# Patient Record
Sex: FEMALE | Race: ASIAN
[De-identification: names, ages, dates, MRNs, and addresses within clinical notes are randomized per-mention and may not be internally consistent; named-entity substitution may affect disease eponyms.]

---

## 2019-10-15 ENCOUNTER — HOSPITAL ENCOUNTER (OUTPATIENT)
Dept: HOSPITAL 75 - RAD | Age: 74
End: 2019-10-15
Attending: NURSE PRACTITIONER
Payer: MEDICARE

## 2019-10-15 DIAGNOSIS — M85.89: ICD-10-CM

## 2019-10-15 DIAGNOSIS — M89.49: ICD-10-CM

## 2019-10-15 DIAGNOSIS — Z78.0: ICD-10-CM

## 2019-10-15 DIAGNOSIS — Z13.820: Primary | ICD-10-CM

## 2019-10-15 PROCEDURE — 77080 DXA BONE DENSITY AXIAL: CPT

## 2019-10-15 NOTE — DIAGNOSTIC IMAGING REPORT
INDICATION: Postmenopausal state, screening for osteoporosis



COMPARISON: 11/10/2016



FINDINGS:



AP Spine L1-L4:  

[BMD (g/cm2): 1.255] [T-Score: .5] [Z-Score: na]

[BMD Previous: 1.264] [BMD % Change: -0.7]



LT Hip Neck:       

[BMD (g/cm2): 0.844] [T-Score: -1.3] [Z-Score: na]



LT Hip Total:       

[BMD (g/cm2):0.844] [T-Score:-1.3] [Z-Score: na]

[BMD Previous: 0.825] [BMD % Change: 2.3]



RT Hip Neck:      

[BMD (g/cm2):0.816] [T-Score:-1.6] [Z-Score:na]



RT Hip Total:      

[BMD (g/cm2):0.835] [T-score:-1.4] [Z-Score:na]

[BMD Previous:0.838] [BMD % Change:-0.4]



*Indicates significant change from prior examination based on 95%

confidence level.



World Health Organization criteria for BMD interpretation

classify patients as Normal (T-score at or above -1.0),

Osteopenic (T-score between -1.0 and -2.5) or Osteoporotic

(T-score at or below -2.5).



LIMITATIONS AND MODIFICATION:  None.



FRACTURE RISK (FRAX SCORE):

The ten year probability of (%): 

Major Osteoporotic Fracture: [10.4]

Hip Fracture: [2.3]



IMPRESSION:

1. Osteopenia (Low bone mass).

2. No significant change in bone mineral density since prior

examination. 

3. See below National Osteoporosis Foundation guidelines on when

to potentially initiate pharmacologic therapy. 



Based on the National Osteoporosis Foundation Guidelines,

pharmacologic treatment should be initiated in any of the

following, unless clinical conditions suggest otherwise:



*  Any patient with prior fragility fracture of the hip or

vertebrae. A spine fracture indicates 5X risk for subsequent

spine fracture and 2X risk for subsequent hip fracture.



*  Osteoporosis (T-score <-2.5).



*  Postmenopausal women and men age 50 and older with low bone

mass/osteopenia (T-score between -1.0 and -2.5) by DXA and

10-year major osteoporotic fracture greater than 20% or a 10-year

probability of hip fracture greater than 3%. These fracture risks

are supplied above in the FRAX score, if applicable.



*  Clinician judgement and/or patient preferences may indicate

treatment for people with 10-year fracture probabilities above or

below these levels.



Dictated by: 



  Dictated on workstation # [id number]

## 2020-12-10 ENCOUNTER — HOSPITAL ENCOUNTER (OUTPATIENT)
Dept: HOSPITAL 75 - CARD | Age: 75
End: 2020-12-10
Attending: INTERNAL MEDICINE
Payer: MEDICARE

## 2020-12-10 DIAGNOSIS — I08.3: Primary | ICD-10-CM

## 2020-12-10 DIAGNOSIS — I48.0: ICD-10-CM

## 2020-12-10 LAB
ALBUMIN SERPL-MCNC: 4.5 GM/DL (ref 3.2–4.5)
ALP SERPL-CCNC: 33 U/L (ref 40–136)
ALT SERPL-CCNC: 16 U/L (ref 0–55)
BILIRUB SERPL-MCNC: 0.3 MG/DL (ref 0.1–1)
BUN/CREAT SERPL: 21
CALCIUM SERPL-MCNC: 9.6 MG/DL (ref 8.5–10.1)
CHLORIDE SERPL-SCNC: 103 MMOL/L (ref 98–107)
CO2 SERPL-SCNC: 32 MMOL/L (ref 21–32)
CREAT SERPL-MCNC: 0.76 MG/DL (ref 0.6–1.3)
GFR SERPLBLD BASED ON 1.73 SQ M-ARVRAT: > 60 ML/MIN
GLUCOSE SERPL-MCNC: 101 MG/DL (ref 70–105)
HCT VFR BLD CALC: 36 % (ref 35–52)
HGB BLD-MCNC: 10.8 G/DL (ref 11.5–16)
MCH RBC QN AUTO: 24 PG (ref 25–34)
MCHC RBC AUTO-ENTMCNC: 30 G/DL (ref 32–36)
MCV RBC AUTO: 80 FL (ref 80–99)
PLATELET # BLD: 251 10^3/UL (ref 130–400)
PMV BLD AUTO: 11.1 FL (ref 9–12.2)
POTASSIUM SERPL-SCNC: 3.5 MMOL/L (ref 3.6–5)
PROT SERPL-MCNC: 7.4 GM/DL (ref 6.4–8.2)
SODIUM SERPL-SCNC: 142 MMOL/L (ref 135–145)
WBC # BLD AUTO: 8.6 10^3/UL (ref 4.3–11)

## 2020-12-10 PROCEDURE — 84443 ASSAY THYROID STIM HORMONE: CPT

## 2020-12-10 PROCEDURE — 85027 COMPLETE CBC AUTOMATED: CPT

## 2020-12-10 PROCEDURE — 80053 COMPREHEN METABOLIC PANEL: CPT

## 2020-12-10 PROCEDURE — 36415 COLL VENOUS BLD VENIPUNCTURE: CPT

## 2020-12-10 PROCEDURE — 93306 TTE W/DOPPLER COMPLETE: CPT

## 2021-01-18 ENCOUNTER — HOSPITAL ENCOUNTER (OUTPATIENT)
Dept: HOSPITAL 75 - LABNPT | Age: 76
End: 2021-01-18
Attending: INTERNAL MEDICINE
Payer: MEDICARE

## 2021-01-18 DIAGNOSIS — Z20.822: ICD-10-CM

## 2021-01-18 DIAGNOSIS — Z01.89: Primary | ICD-10-CM

## 2021-01-18 PROCEDURE — 87635 SARS-COV-2 COVID-19 AMP PRB: CPT

## 2021-01-20 ENCOUNTER — HOSPITAL ENCOUNTER (OUTPATIENT)
Dept: HOSPITAL 75 - SDC | Age: 76
End: 2021-01-20
Attending: INTERNAL MEDICINE
Payer: MEDICARE

## 2021-01-20 VITALS — SYSTOLIC BLOOD PRESSURE: 118 MMHG | DIASTOLIC BLOOD PRESSURE: 73 MMHG

## 2021-01-20 VITALS — SYSTOLIC BLOOD PRESSURE: 127 MMHG | DIASTOLIC BLOOD PRESSURE: 72 MMHG

## 2021-01-20 VITALS — DIASTOLIC BLOOD PRESSURE: 74 MMHG | SYSTOLIC BLOOD PRESSURE: 130 MMHG

## 2021-01-20 VITALS — DIASTOLIC BLOOD PRESSURE: 96 MMHG | SYSTOLIC BLOOD PRESSURE: 114 MMHG

## 2021-01-20 VITALS — SYSTOLIC BLOOD PRESSURE: 124 MMHG | DIASTOLIC BLOOD PRESSURE: 93 MMHG

## 2021-01-20 VITALS — SYSTOLIC BLOOD PRESSURE: 136 MMHG | DIASTOLIC BLOOD PRESSURE: 85 MMHG

## 2021-01-20 VITALS — HEIGHT: 64.02 IN | BODY MASS INDEX: 27.48 KG/M2 | WEIGHT: 160.94 LBS

## 2021-01-20 VITALS — DIASTOLIC BLOOD PRESSURE: 72 MMHG | SYSTOLIC BLOOD PRESSURE: 122 MMHG

## 2021-01-20 DIAGNOSIS — I10: ICD-10-CM

## 2021-01-20 DIAGNOSIS — I48.0: Primary | ICD-10-CM

## 2021-01-20 DIAGNOSIS — Z79.899: ICD-10-CM

## 2021-01-20 DIAGNOSIS — E11.9: ICD-10-CM

## 2021-01-20 DIAGNOSIS — Z79.84: ICD-10-CM

## 2021-01-20 DIAGNOSIS — Z79.01: ICD-10-CM

## 2021-01-20 DIAGNOSIS — E66.9: ICD-10-CM

## 2021-01-20 LAB
ALBUMIN SERPL-MCNC: 4.4 GM/DL (ref 3.2–4.5)
ALP SERPL-CCNC: 39 U/L (ref 40–136)
ALT SERPL-CCNC: 19 U/L (ref 0–55)
APTT BLD: 36 SEC (ref 24–35)
BILIRUB SERPL-MCNC: 0.4 MG/DL (ref 0.1–1)
BUN/CREAT SERPL: 28
CALCIUM SERPL-MCNC: 9.5 MG/DL (ref 8.5–10.1)
CHLORIDE SERPL-SCNC: 106 MMOL/L (ref 98–107)
CHOLEST SERPL-MCNC: 115 MG/DL (ref ?–200)
CO2 SERPL-SCNC: 28 MMOL/L (ref 21–32)
CREAT SERPL-MCNC: 0.79 MG/DL (ref 0.6–1.3)
GFR SERPLBLD BASED ON 1.73 SQ M-ARVRAT: > 60 ML/MIN
GLUCOSE SERPL-MCNC: 110 MG/DL (ref 70–105)
HCT VFR BLD CALC: 38 % (ref 35–52)
HDLC SERPL-MCNC: 66 MG/DL (ref 40–60)
HGB BLD-MCNC: 11.6 G/DL (ref 11.5–16)
INR PPP: 1.1 (ref 0.8–1.4)
MCH RBC QN AUTO: 24 PG (ref 25–34)
MCHC RBC AUTO-ENTMCNC: 31 G/DL (ref 32–36)
MCV RBC AUTO: 78 FL (ref 80–99)
PLATELET # BLD: 235 10^3/UL (ref 130–400)
PMV BLD AUTO: 11.7 FL (ref 9–12.2)
POTASSIUM SERPL-SCNC: 4.1 MMOL/L (ref 3.6–5)
PROT SERPL-MCNC: 7.2 GM/DL (ref 6.4–8.2)
PROTHROMBIN TIME: 14.9 SEC (ref 12.2–14.7)
SODIUM SERPL-SCNC: 141 MMOL/L (ref 135–145)
TRIGL SERPL-MCNC: 26 MG/DL (ref ?–150)
VLDLC SERPL CALC-MCNC: 5 MG/DL (ref 5–40)
WBC # BLD AUTO: 6.3 10^3/UL (ref 4.3–11)

## 2021-01-20 PROCEDURE — 80053 COMPREHEN METABOLIC PANEL: CPT

## 2021-01-20 PROCEDURE — 85610 PROTHROMBIN TIME: CPT

## 2021-01-20 PROCEDURE — 80061 LIPID PANEL: CPT

## 2021-01-20 PROCEDURE — 92960 CARDIOVERSION ELECTRIC EXT: CPT

## 2021-01-20 PROCEDURE — 85027 COMPLETE CBC AUTOMATED: CPT

## 2021-01-20 PROCEDURE — 93312 ECHO TRANSESOPHAGEAL: CPT

## 2021-01-20 PROCEDURE — 93005 ELECTROCARDIOGRAM TRACING: CPT

## 2021-01-20 PROCEDURE — 36415 COLL VENOUS BLD VENIPUNCTURE: CPT

## 2021-01-20 PROCEDURE — 87081 CULTURE SCREEN ONLY: CPT

## 2021-01-20 PROCEDURE — 85730 THROMBOPLASTIN TIME PARTIAL: CPT

## 2021-01-20 PROCEDURE — 71045 X-RAY EXAM CHEST 1 VIEW: CPT

## 2021-01-20 NOTE — DIAGNOSTIC IMAGING REPORT
INDICATION: Pre-heart catheterization.



TIME OF EXAM: 10:48 AM



Correlation is made with prior chest 07/03/2015.



FINDINGS: Heart is enlarged. Lungs are clear. No infiltrate or

failure is detected. The pulmonary vascularity is normal. No

effusion or pneumothorax is identified.



IMPRESSION: Cardiomegaly. No acute cardiopulmonary process is

detected.



Dictated by: 



  Dictated on workstation # [id number]

## 2021-01-20 NOTE — CARDIOVERSION
Cardioversion


PROCEDURE PHYSICIAN:   Ave Muniz 


 


DATE OF PROCEDURE:  1/20/21 


 


DIRECT EXTERNAL ELECTRICAL CARDIOVERSION:  


 


Indications:


Atrial Fibrillation with rapid ventricular rate





Preoperative diagnoses: 


Atrial Fibrillation with rapid ventricular rate


 


Postoperative diagnosis: 


Sinus rhythm, Successful Electrical Cardioversion





Anesthesia:


By Anesthesia services





Complications: 


None





Specimen: 


None





Contrast: 0





Flouroscopy: none





Procedure Details:


 


The patient was brought the cath lab after informed consent was taken, all the 

risks and complications were explained including the risk of stroke. Electrical 

cardioversion was carried out with anesthesia support with propofol. 150 joules 

of synchronized shock was  delivered through external patches which promptly 

restored sinus rhythm. The patient tolerated the procedure well.  





Conclusions:


Successful electrical cardioversion with no complication





Final Diagnosis:


Paroxysmal atrial fibrillation


Palpitation


Hypertension


Diabetes mellitus











AVE MUNIZ MD              Jan 20, 2021 11:45

## 2021-01-20 NOTE — NUR
SPOKE WITH THE PT AND HER  AND CALLED JULIAN AND EDIN TO COMPLETE THE MED REC



11- HCTZ 25MG #90/90DS

11- PANTOPRAZOLE 40MG #90/90DS

11- METFORMIN 1000MG #180

11- MELOXICAM 15MG #90/90DS

12- DILTIAZEM ER 120MG #60/30DS

01- FLECAINIDE 50MG #60/3D0S



METFORMIN 1000MG- DIRECTIONS ARE 1 TAB BID BUT ACCORDING TO THE PT SHE ONLY TAKES A DOSE IF 
HE BLOOD SUGAR IS OVER 125



OTC MEDS:

GLUCOSAMINE/CHONDROITIN

FISH OIL

VIT C

VIT D3

ZINC

MAGNESIUM

IRON

## 2021-01-20 NOTE — ANESTHESIA-GENERAL POST-OP
MAC


Patient Condition


Mental Status/LOC:  Same as Preop


Cardiovascular:  Satisfactory


Nausea/Vomiting:  Absent


Respiratory:  Satisfactory


Pain:  Controlled


Complications:  Absent





Post Op Complications


Complications


None





Follow Up Care/Instructions


Patient Instructions


None needed.





Anesthesiology Discharge Order


Discharge Order


Patient is doing well, no complaints, stable vital signs, no apparent adverse 

anesthesia problems.











DEJON BENJAMIN DO         Jan 20, 2021 11:34

## 2021-01-20 NOTE — DISCHARGE INST-CARDIOLOGY
Discharge Inst-Cardiac


Problems Reviewed?:  Yes


Discharge Medications


Continued Medications:  


Apixaban (Eliquis) 5 Mg Tablet


5 MG PO BID, TAB





Ascorbic Acid (Vitamin C) 500 Mg Tab.chew


500 MG PO DAILY, TAB





Cholecalciferol (Vitamin D3) (Vitamin D3) 25 Mcg Capsule


25 MCG PO DAILY, CAP





Diltiazem HCl (Diltiazem 24Hr Cd) 120 Mg Cap.er.24h


120 MG PO BID, CAP





Ferrous Sulfate (Iron) 325 Mg Tablet


325 MG PO DAILY, TAB





Fish Oil/Dha/Epa (Fish Oil 1,200 mg Fish Oil) 1 Each Capsule


1 EACH PO DAILY, CAP





Flecainide Acetate (Flecainide Acetate) 50 Mg Tablet


50 MG PO Q12H, TAB





Glucosa Kumari 2Kcl/Chondroitin Kumari (Glucosamine & Chondroitin Cap) 1 Each Capsule


1 EACH PO DAILY, CAP





Hydrochlorothiazide (Hydrochlorothiazide) 25 Mg Tablet


25 MG PO DAILY, TAB





Magnesium Oxide (Magnesium) 400 Mg Tablet


400 MG PO WEEK, TAB





Meloxicam (Meloxicam) 15 Mg Tablet


15 MG PO DAILY, TAB





Metformin HCl (Metformin HCl) 1,000 Mg Tablet


1000 MG PO BID PRN for BLOOD SUGAR OVER 125, TAB





Pantoprazole Sodium (Pantoprazole Sodium) 40 Mg Tablet.dr


40 MG PO DAILY, TAB





Zinc Gluconate (Zinc) 50 Mg Tablet


50 MG PO DAILY, TAB











Patient Instructions


Patient Instructions:  


Appointment with Dr. Muniz's office in 2-4 weeks





Activity & Diet


Discharge Diet:  Cardiac Diet


Drink 6-8 Glasses/Fluids/Day:  Yes


Activity as Tolerated:  Yes











AVE MUNIZ MD              Jan 20, 2021 11:44

## 2021-04-14 ENCOUNTER — HOSPITAL ENCOUNTER (OUTPATIENT)
Dept: HOSPITAL 75 - LABNPT | Age: 76
End: 2021-04-14
Attending: INTERNAL MEDICINE
Payer: MEDICARE

## 2021-04-14 DIAGNOSIS — Z53.9: Primary | ICD-10-CM

## 2021-10-19 ENCOUNTER — HOSPITAL ENCOUNTER (OUTPATIENT)
Dept: HOSPITAL 75 - RAD | Age: 76
End: 2021-10-19
Attending: FAMILY MEDICINE
Payer: MEDICARE

## 2021-10-19 DIAGNOSIS — Z78.0: Primary | ICD-10-CM

## 2021-10-19 PROCEDURE — 77080 DXA BONE DENSITY AXIAL: CPT

## 2021-10-19 NOTE — DIAGNOSTIC IMAGING REPORT
INDICATION: Postmenopausal.



COMPARISON: 10/15/2019.



FINDINGS: The bone mineral density of the spine, hips, and

femoral necks was measured.



The total T-score for the spine is 1.1. On the prior exam, the

T-score was 0.5.



The total T-score for the left hip is -1.2 and for the right hip

-1.4. On the previous exam, the respected T-scores were -1.3 and

-1.4.



The T-score for the left femoral neck is -1.1 and for the right

-1.6. Previously the T-scores were -1.2 and -1.6.



AP Spine L1-L4:  

[BMD (g/cm2): 1.333] [T-Score: 1.1] [Z-Score: NA]

[BMD Previous: 1.255] [BMD % Change: 6.2]



LT Hip Neck:       

[BMD (g/cm2): 0.878] [T-Score: -1.1] [Z-Score: NA]



LT Hip Total:       

[BMD (g/cm2):0.856] [T-Score:-1.2] [Z-Score: NA]

[BMD Previous: 0.844] [BMD % Change: 2.3]



RT Hip Neck:      

[BMD (g/cm2):0.814] [T-Score:-1.6] [Z-Score:NA]



RT Hip Total:      

[BMD (g/cm2):0.826] [T-score:-1.4] [Z-Score:NA]

[BMD Previous:0.835] [BMD % Change:-0.4]



*Indicates significant change from prior examination based on 95%

confidence level.



World Health Organization criteria for BMD interpretation

classify patients as Normal (T-score at or above -1.0),

Osteopenic (T-score between -1.0 and -2.5) or Osteoporotic

(T-score at or below -2.5).



LIMITATIONS AND MODIFICATION:  None.



FRACTURE RISK (FRAX SCORE):

The ten year probability of (%): 

Major Osteoporotic Fracture: [13.1]

Hip Fracture: [3.1]



IMPRESSION:

1. The bone mineral density of the spine has increased since the

prior exam. The T-score is well within normal limits.

2. The bone mineral density of the hips and femoral necks has not

changed significantly. These values do indicate osteopenia.

3. See below National Osteoporosis Foundation guidelines on when

to potentially initiate pharmacologic therapy. 



Based on the National Osteoporosis Foundation Guidelines,

pharmacologic treatment should be initiated in any of the

following, unless clinical conditions suggest otherwise:



*  Any patient with prior fragility fracture of the hip or

vertebrae. A spine fracture indicates 5X risk for subsequent

spine fracture and 2X risk for subsequent hip fracture.



*  Osteoporosis (T-score <-2.5).



*  Postmenopausal women and men age 50 and older with low bone

mass/osteopenia (T-score between -1.0 and -2.5) by DXA and

10-year major osteoporotic fracture greater than 20% or a 10-year

probability of hip fracture greater than 3%. These fracture risks

are supplied above in the FRAX score, if applicable.



*  Clinician judgement and/or patient preferences may indicate

treatment for people with 10-year fracture probabilities above or

below these levels.



Dictated by: 



  Dictated on workstation # [id number]

## 2021-12-03 ENCOUNTER — HOSPITAL ENCOUNTER (OUTPATIENT)
Dept: HOSPITAL 75 - RAD | Age: 76
End: 2021-12-03
Attending: PODIATRIST
Payer: MEDICARE

## 2021-12-03 DIAGNOSIS — R05.3: Primary | ICD-10-CM

## 2021-12-03 PROCEDURE — 71046 X-RAY EXAM CHEST 2 VIEWS: CPT

## 2021-12-03 NOTE — DIAGNOSTIC IMAGING REPORT
EXAMINATION: Chest 2 view



HISTORY: Cough



COMPARISON: 01/20/2021



FINDINGS: 



The lungs are clear without edema or pneumonia. No pleural

effusion or pneumothorax. Heart size is normal.



IMPRESSION: 



1. Clear lungs.



Dictated by: 



  Dictated on workstation # [id number]

## 2022-04-20 ENCOUNTER — HOSPITAL ENCOUNTER (OUTPATIENT)
Dept: HOSPITAL 75 - CARD | Age: 77
End: 2022-04-20
Attending: INTERNAL MEDICINE
Payer: MEDICARE

## 2022-04-20 VITALS — SYSTOLIC BLOOD PRESSURE: 145 MMHG | DIASTOLIC BLOOD PRESSURE: 83 MMHG

## 2022-04-20 DIAGNOSIS — I25.10: ICD-10-CM

## 2022-04-20 DIAGNOSIS — I08.3: ICD-10-CM

## 2022-04-20 DIAGNOSIS — I11.9: Primary | ICD-10-CM

## 2022-04-20 PROCEDURE — 93017 CV STRESS TEST TRACING ONLY: CPT

## 2022-04-20 PROCEDURE — 93306 TTE W/DOPPLER COMPLETE: CPT

## 2022-04-20 PROCEDURE — 78452 HT MUSCLE IMAGE SPECT MULT: CPT

## 2022-04-20 NOTE — CARDIOLOGY STRESS TEST REPORT
Stress Test Report


Date of Procedure/Referring:


Date of Procedure:  Apr 20, 2022


PCP


Bipin Muniz MD


Admitting Physician


Frank Garcia MD





Indications:


CP





Baseline Heart Rate:


57





Baseline Blood Pressure:


Blood Pressure Systolic:  145


Blood Pressure Diastolic:  83


Baseline Vitals





Vital Signs








  Date Time  Temp Pulse Resp B/P (MAP) Pulse Ox O2 Delivery O2 Flow Rate FiO2


 


4/20/22 10:04  57  145/83 (103)    











Baseline EKG:


Baseline EKG:  NSR





Summary


After explaining the procedure to the patient, she  signed a consent and then 

brought to the stress nuclear laboratory.


Patient received 0.4 mg Lexiscan for stress test, ECG, heart rate and blood 

pressure were monitored continuously.  Resting and stress dose of radio tracer 

were injected, imaging was acquired and reviewed in short axis, horizontal long 

axis and vertical long axis views.


TID:  0.97


SSS:  0


SDS:  0


EF:  77


1.  Patient tolerated Lexiscan well


2.  No significant ischemia or infarction on SPECT images


3.  Normal left ventricular size, EF 77%





Copy


Copies To 1:   FRANK GARCIA MD, BASHAR J MD              Apr 20, 2022 12:46

## 2022-08-24 ENCOUNTER — HOSPITAL ENCOUNTER (OUTPATIENT)
Dept: HOSPITAL 75 - PREOP | Age: 77
LOS: 5 days | End: 2022-08-29
Attending: SPECIALIST
Payer: MEDICARE

## 2022-08-24 VITALS — HEIGHT: 62.01 IN | WEIGHT: 156.31 LBS | BODY MASS INDEX: 28.4 KG/M2

## 2022-08-24 DIAGNOSIS — Z01.818: Primary | ICD-10-CM

## 2022-08-24 DIAGNOSIS — H25.9: ICD-10-CM

## 2022-09-02 ENCOUNTER — HOSPITAL ENCOUNTER (OUTPATIENT)
Dept: HOSPITAL 75 - SDC | Age: 77
Discharge: HOME | End: 2022-09-02
Attending: SPECIALIST
Payer: MEDICARE

## 2022-09-02 VITALS — SYSTOLIC BLOOD PRESSURE: 150 MMHG | DIASTOLIC BLOOD PRESSURE: 79 MMHG

## 2022-09-02 VITALS — HEIGHT: 62.01 IN | WEIGHT: 156.31 LBS | BODY MASS INDEX: 28.4 KG/M2

## 2022-09-02 VITALS — SYSTOLIC BLOOD PRESSURE: 162 MMHG | DIASTOLIC BLOOD PRESSURE: 86 MMHG

## 2022-09-02 DIAGNOSIS — R73.03: ICD-10-CM

## 2022-09-02 DIAGNOSIS — H25.9: Primary | ICD-10-CM

## 2022-09-02 DIAGNOSIS — Z79.01: ICD-10-CM

## 2022-09-02 PROCEDURE — 66984 XCAPSL CTRC RMVL W/O ECP: CPT

## 2022-09-02 RX ADMIN — TETRACAINE HYDROCHLORIDE PRN ML: 5 SOLUTION OPHTHALMIC at 06:32

## 2022-09-02 RX ADMIN — PHENYLEPHRINE HYDROCHLORIDE SCH ML: 100 SOLUTION/ DROPS OPHTHALMIC at 06:38

## 2022-09-02 RX ADMIN — TROPICAMIDE SCH ML: 10 SOLUTION/ DROPS OPHTHALMIC at 06:38

## 2022-09-02 RX ADMIN — TETRACAINE HYDROCHLORIDE PRN ML: 5 SOLUTION OPHTHALMIC at 06:38

## 2022-09-02 RX ADMIN — TROPICAMIDE SCH ML: 10 SOLUTION/ DROPS OPHTHALMIC at 06:45

## 2022-09-02 RX ADMIN — TROPICAMIDE SCH ML: 10 SOLUTION/ DROPS OPHTHALMIC at 06:50

## 2022-09-02 RX ADMIN — PHENYLEPHRINE HYDROCHLORIDE SCH ML: 100 SOLUTION/ DROPS OPHTHALMIC at 06:50

## 2022-09-02 RX ADMIN — TETRACAINE HYDROCHLORIDE PRN ML: 5 SOLUTION OPHTHALMIC at 06:45

## 2022-09-02 RX ADMIN — TETRACAINE HYDROCHLORIDE PRN ML: 5 SOLUTION OPHTHALMIC at 06:50

## 2022-09-02 RX ADMIN — PHENYLEPHRINE HYDROCHLORIDE SCH ML: 100 SOLUTION/ DROPS OPHTHALMIC at 06:45

## 2022-09-02 NOTE — ANESTHESIA-GENERAL POST-OP
MAC


Patient Condition


Mental Status/LOC:  Same as Preop


Cardiovascular:  Satisfactory


Nausea/Vomiting:  Absent


Respiratory:  Satisfactory


Pain:  Controlled


Complications:  Absent





Post Op Complications


Complications


None





Follow Up Care/Instructions


Patient Instructions


None needed.





Anesthesiology Discharge Order


Discharge Order


Patient is doing well, no complaints, stable vital signs, no apparent adverse 

anesthesia problems.   


No complications reported per nursing.











DEBORAH HANNAH CRNA            Sep 2, 2022 12:34

## 2022-09-02 NOTE — OPHTHALMOLOGY OPERATIVE REPORT
Cataract removal/placement IOL


PREOPERATIVE DIAGNOSIS:    Cataract Left Eye


POSTOPERATIVE DIAGNOSIS: Cataract Left Eye





PROCEDURE: Cataract removal and placement of posterior chamber implant, left eye





SURGEON: Óscar Castro 





ANESTHESIA: Topical with sedation





COMPLICATIONS: None





ESTIMATED BLOOD LOSS: Minimal 





DESCRIPTION OF PROCEDURE:


After proper informed consent was obtained, the patient, a 76 female, was taken 

to the Operating Room and the left eye was anesthetized with tetracaine.  The 

left eye was then prepped and draped in the usual manner.  A wire lid speculum 

was placed. A paracentesis was made at the left hand position. Preservative free

lidocaine was injected into the anterior chamber followed by viscoelastic.  A 

clear corneal incision was made in the temporal position. A capsulorrhexis was 

preformed and the central nuclear and cortical material were removed.  The 

posterior capsule was polished and an Tommy 22.0 AU00T0 was placed into the 

capsular bag. The residual viscoelastic was aspirated and balanced saline 

solution was injected into the anterior chamber.  Moxifloxacin was injected into

the anterior chamber.





The wound was checked and found to be water tight.





The patient tolerated the procedure well without complications.











ÓSCAR CASTRO MD              Sep 2, 2022 07:13

## 2022-09-02 NOTE — OPHTHALMOLOGY OPERATIVE REPORT
Cataract removal/placement IOL


PREOPERATIVE DIAGNOSIS:    Cataract Left Eye


POSTOPERATIVE DIAGNOSIS: Cataract Left Eye





PROCEDURE: Cataract removal and placement of posterior chamber implant, left eye





SURGEON: Óscar Castro 





ANESTHESIA: Topical with sedation





COMPLICATIONS: None





ESTIMATED BLOOD LOSS: Minimal 





DESCRIPTION OF PROCEDURE:


After proper informed consent was obtained, the patient, a 76 female, was taken 

to the Operating Room and the left eye was anesthetized with tetracaine.  The 

left eye was then prepped and draped in the usual manner.  A wire lid speculum 

was placed. A paracentesis was made at the left hand position. Preservative free

lidocaine was injected into the anterior chamber followed by viscoelastic.  A 

clear corneal incision was made in the temporal position. A capsulorrhexis was 

preformed and the central nuclear and cortical material were removed.  The 

posterior capsule was polished and an Tommy 22.0 AU00T0 was placed into the 

capsular bag. The residual viscoelastic was aspirated and balanced saline 

solution was injected into the anterior chamber.  Moxifloxacin was injected into

the anterior chamber.





The wound was checked and found to be water tight.





The patient tolerated the procedure well without complications.











ÓSCAR CASTRO MD              Sep 2, 2022 07:21

## 2022-09-02 NOTE — OPHTHALMOLOGIST PRE-OP NOTE
Pre-Operative Progress Note


H&P Reviewed


The H&P was reviewed, patient examined and no changes noted.


Date H&P Reviewed:  Sep 2, 2022


Time H&P Reviewed:  06:41


Pre-Op Dx


Cataract, Left Eye











MARJAN CASTRO MD              Sep 2, 2022 06:41

## 2022-12-10 ENCOUNTER — HOSPITAL ENCOUNTER (EMERGENCY)
Dept: HOSPITAL 75 - ER | Age: 77
Discharge: HOME | End: 2022-12-10
Payer: MEDICARE

## 2022-12-10 VITALS — SYSTOLIC BLOOD PRESSURE: 162 MMHG | DIASTOLIC BLOOD PRESSURE: 68 MMHG

## 2022-12-10 DIAGNOSIS — Z79.01: ICD-10-CM

## 2022-12-10 DIAGNOSIS — I48.91: Primary | ICD-10-CM

## 2022-12-10 LAB
ALBUMIN SERPL-MCNC: 4.5 GM/DL (ref 3.2–4.5)
ALP SERPL-CCNC: 33 U/L (ref 40–136)
ALT SERPL-CCNC: 17 U/L (ref 0–55)
APTT BLD: 40 SEC (ref 24–35)
BASOPHILS # BLD AUTO: 0.1 10^3/UL (ref 0–0.1)
BASOPHILS NFR BLD AUTO: 1 % (ref 0–10)
BILIRUB SERPL-MCNC: 0.3 MG/DL (ref 0.1–1)
BUN/CREAT SERPL: 25
CALCIUM SERPL-MCNC: 9.9 MG/DL (ref 8.5–10.1)
CHLORIDE SERPL-SCNC: 106 MMOL/L (ref 98–107)
CO2 SERPL-SCNC: 23 MMOL/L (ref 21–32)
CREAT SERPL-MCNC: 0.71 MG/DL (ref 0.6–1.3)
EOSINOPHIL # BLD AUTO: 0.2 10^3/UL (ref 0–0.3)
EOSINOPHIL NFR BLD AUTO: 2 % (ref 0–10)
GFR SERPLBLD BASED ON 1.73 SQ M-ARVRAT: 86 ML/MIN
GLUCOSE SERPL-MCNC: 109 MG/DL (ref 70–105)
HCT VFR BLD CALC: 39 % (ref 35–52)
HGB BLD-MCNC: 13 G/DL (ref 11.5–16)
INR PPP: 1.1 (ref 0.8–1.4)
LYMPHOCYTES # BLD AUTO: 3.3 10^3/UL (ref 1–4)
LYMPHOCYTES NFR BLD AUTO: 34 % (ref 12–44)
MAGNESIUM SERPL-MCNC: 2 MG/DL (ref 1.6–2.4)
MANUAL DIFFERENTIAL PERFORMED BLD QL: NO
MCH RBC QN AUTO: 29 PG (ref 25–34)
MCHC RBC AUTO-ENTMCNC: 33 G/DL (ref 32–36)
MCV RBC AUTO: 86 FL (ref 80–99)
MONOCYTES # BLD AUTO: 0.6 10^3/UL (ref 0–1)
MONOCYTES NFR BLD AUTO: 6 % (ref 0–12)
NEUTROPHILS # BLD AUTO: 5.7 10^3/UL (ref 1.8–7.8)
NEUTROPHILS NFR BLD AUTO: 57 % (ref 42–75)
PLATELET # BLD: 226 10^3/UL (ref 130–400)
PMV BLD AUTO: 11.1 FL (ref 9–12.2)
POTASSIUM SERPL-SCNC: 3.7 MMOL/L (ref 3.6–5)
PROT SERPL-MCNC: 7.4 GM/DL (ref 6.4–8.2)
PROTHROMBIN TIME: 14.4 SEC (ref 12.2–14.7)
SODIUM SERPL-SCNC: 141 MMOL/L (ref 135–145)
WBC # BLD AUTO: 9.9 10^3/UL (ref 4.3–11)

## 2022-12-10 PROCEDURE — 71045 X-RAY EXAM CHEST 1 VIEW: CPT

## 2022-12-10 PROCEDURE — 80053 COMPREHEN METABOLIC PANEL: CPT

## 2022-12-10 PROCEDURE — 83735 ASSAY OF MAGNESIUM: CPT

## 2022-12-10 PROCEDURE — 85610 PROTHROMBIN TIME: CPT

## 2022-12-10 PROCEDURE — 84484 ASSAY OF TROPONIN QUANT: CPT

## 2022-12-10 PROCEDURE — 85025 COMPLETE CBC W/AUTO DIFF WBC: CPT

## 2022-12-10 PROCEDURE — 93005 ELECTROCARDIOGRAM TRACING: CPT

## 2022-12-10 PROCEDURE — 36415 COLL VENOUS BLD VENIPUNCTURE: CPT

## 2022-12-10 PROCEDURE — 93041 RHYTHM ECG TRACING: CPT

## 2022-12-10 PROCEDURE — 83874 ASSAY OF MYOGLOBIN: CPT

## 2022-12-10 PROCEDURE — 85730 THROMBOPLASTIN TIME PARTIAL: CPT

## 2022-12-10 NOTE — ED CHEST PAIN
General


Chief Complaint:  Chest Pain


Stated Complaint:  SUDDEN CHEST PAIN


Source:  patient, family


Exam Limitations:  language barrier





History of Present Illness


Date Seen by Provider:  Dec 10, 2022


Time Seen by Provider:  13:09


Initial Comments


Patient is a 77-year-old female with a history of atrial fibrillation and 

hypertension who presents to the emergency department with her  by 

private vehicle chief complaint of substernal chest pain.  Initially "8" out of 

10 pain.  Patient states she had sudden onset of chest pain approximately 15 

minutes prior to arrival.  She describes it as a "pressure".  It does not 

radiate.  She feels a little short of breath.  She is not nauseous.  She is not 

sweaty.  She has no history of known coronary artery disease.  She is 

chronically anticoagulated on Eliquis for her A. fib.  Currently in sinus rhyth

m.  No recent illnesses, fevers, chills, cough or congestion.  She states that 

she did not sleep well last night.  She denies any problems with bowel or 

bladder.  She has not taken anything for the pain.





All other review of systems reviewed and negative except as stated.


Timing/Duration:  1/2 hour


Severity/Quality:  moderate, pressure


Location:  substernal


Radiation:  no radiation


Activities at Onset:  activity (cooking)


ASA po PTA:  No


NTG SL PTA:  No


Associated Symptoms:  shortness of breath





Allergies and Home Medications


Allergies


Coded Allergies:  


     No Known Drug Allergies (Unverified , 11/10/17)





Patient Home Medication List


Home Medication List Reviewed:  Yes


Apixaban (Eliquis) 5 Mg Tablet, 5 MG PO BID, (Reported)


   Entered as Reported by: NOE SLOAN on 21 1111


Diltiazem HCl (Cardizem Cd) 180 Mg Cap.er.24h, 180 MG PO DAILY, (Reported)


   Entered as Reported by: MARSHALL STONE on 22 0940


Flecainide Acetate (Flecainide Acetate) 50 Mg Tablet, 50 MG PO Q12H, (Reported)


   Entered as Reported by: NOE SLOAN on 21 1111


Losartan Potassium (Losartan Potassium) 50 Mg Tablet, 50 MG PO DAILY, (Reported)


   Entered as Reported by: MARSHALL STONE on 22 0940


Meloxicam (Meloxicam) 7.5 Mg Tablet, 7.5 MG PO DAILY, (Reported)


   Entered as Reported by: MARSHALL STONE on 22 0940





Review of Systems


Review of Systems


Constitutional:  see HPI


EENTM:  No Symptoms Reported


Respiratory:  Shortness of Air


Cardiovascular:  Chest Pain


Gastrointestinal:  No Symptoms Reported


Genitourinary:  No Symptoms Reported


Musculoskeletal:  no symptoms reported


Skin:  no symptoms reported





All Other Systems Reviewed


Negative Unless Noted:  Yes





Past Medical-Social-Family Hx


Patient Social History


Tobacco Use?:  No


Use of E-Cig and/or Vaping dev:  No


Substance use?:  No


Alcohol Use?:  No


Pt feels they are or have been:  No





Immunizations Up To Date


Tetanus Booster (TDap):  Unknown


Influenza Vaccine Up-to-Date:  Yes; Up-to-Date


First/Initial COVID19 Vaccinat:  YES


Second COVID19 Vaccination Edgar:  YES





Seasonal Allergies


Seasonal Allergies:  No





Past Medical History


Surgery/Hospitalization HX:  


EYE SURGERY


Surgeries:  Yes (LYPOMA EXCISED)


Lumpectomy


Respiratory:  No


Cardiac:  Yes


Atrial Fibrillation, Hypertension, Irregular Heartbeat


Neurological:  No


Reproductive Disorders:  No


Sexually Transmitted Disease:  No


HIV/AIDS:  No


Genitourinary:  No


Gastrointestinal:  Yes (EPIGASTRIC PAIN, HX ULCER)


Gastroesophageal Reflux, Hiatal Hernia, Ulcer


Musculoskeletal:  No


Endocrine:  Yes


Diabetes, Non-Insulin dep


HEENT:  No


Loss of Vision:  Bilateral


Hearing Impairment:  Denies


Cancer:  No


Did You Recieve Any Treatments:  No


Psychosocial:  No


Integumentary:  No


Blood Disorders:  No


Adverse Reaction/Blood Tranf:  No





Family Medical History





Hypertension


  17 FATHER


No Pertinent Family Hx





Physical Exam


Vital Signs





Vital Signs - First Documented








 12/10/22





 13:08


 


Pulse 72


 


Resp 23


 


B/P (MAP) 181/89 (119)





Capillary Refill :


Height, Weight, BMI


Height: 5'5.00"


Weight: 162lbs. 0.0oz. 73.813826in; 27.61 BMI


Method:Stated


General Appearance:  No Apparent Distress, WD/WN


HEENT:  PERRL/EOMI


Neck:  Normal Inspection


Respiratory:  Lungs Clear, Normal Breath Sounds, No Accessory Muscle Use, No 

Respiratory Distress


Cardiovascular:  Regular Rate, Rhythm, Normal Peripheral Pulses (2+ radial 

bilaterally)


Gastrointestinal:  Normal Bowel Sounds, Non Tender, Soft


Extremity:  Normal Capillary Refill, Normal Inspection, Normal Range of Motion, 

Non Tender, No Calf Tenderness, No Pedal Edema


Neurologic/Psychiatric:  Alert, Oriented x3, No Motor/Sensory Deficits


Skin:  Normal Color, Warm/Dry





Progress/Results/Core Measures


Results/Orders


Lab Results





Laboratory Tests








Test


 12/10/22


13:12 12/10/22


16:20 Range/Units


 


 


White Blood Count


 9.9 


 


 4.3-11.0


10^3/uL


 


Red Blood Count


 4.53 


 


 3.80-5.11


10^6/uL


 


Hemoglobin 13.0   11.5-16.0  g/dL


 


Hematocrit 39   35-52  %


 


Mean Corpuscular Volume 86   80-99  fL


 


Mean Corpuscular Hemoglobin 29   25-34  pg


 


Mean Corpuscular Hemoglobin


Concent 33 


 


 32-36  g/dL





 


Red Cell Distribution Width 12.7   10.0-14.5  %


 


Platelet Count


 226 


 


 130-400


10^3/uL


 


Mean Platelet Volume 11.1   9.0-12.2  fL


 


Immature Granulocyte % (Auto) 0    %


 


Neutrophils (%) (Auto) 57   42-75  %


 


Lymphocytes (%) (Auto) 34   12-44  %


 


Monocytes (%) (Auto) 6   0-12  %


 


Eosinophils (%) (Auto) 2   0-10  %


 


Basophils (%) (Auto) 1   0-10  %


 


Neutrophils # (Auto)


 5.7 


 


 1.8-7.8


10^3/uL


 


Lymphocytes # (Auto)


 3.3 


 


 1.0-4.0


10^3/uL


 


Monocytes # (Auto)


 0.6 


 


 0.0-1.0


10^3/uL


 


Eosinophils # (Auto)


 0.2 


 


 0.0-0.3


10^3/uL


 


Basophils # (Auto)


 0.1 


 


 0.0-0.1


10^3/uL


 


Immature Granulocyte # (Auto)


 0.0 


 


 0.0-0.1


10^3/uL


 


Prothrombin Time 14.4   12.2-14.7  SEC


 


INR Comment 1.1   0.8-1.4  


 


Activated Partial


Thromboplast Time 40 H


 


 24-35  SEC





 


Sodium Level 141   135-145  MMOL/L


 


Potassium Level 3.7   3.6-5.0  MMOL/L


 


Chloride Level 106     MMOL/L


 


Carbon Dioxide Level 23   21-32  MMOL/L


 


Anion Gap 12   5-14  MMOL/L


 


Blood Urea Nitrogen 18   7-18  MG/DL


 


Creatinine


 0.71 


 


 0.60-1.30


MG/DL


 


Estimat Glomerular Filtration


Rate 86 


 


  





 


BUN/Creatinine Ratio 25    


 


Glucose Level 109 H    MG/DL


 


Calcium Level 9.9   8.5-10.1  MG/DL


 


Corrected Calcium 9.5   8.5-10.1  MG/DL


 


Magnesium Level 2.0   1.6-2.4  MG/DL


 


Total Bilirubin 0.3   0.1-1.0  MG/DL


 


Aspartate Amino Transf


(AST/SGOT) 18 


 


 5-34  U/L





 


Alanine Aminotransferase


(ALT/SGPT) 17 


 


 0-55  U/L





 


Alkaline Phosphatase 33 L    U/L


 


Myoglobin


 39.1 


 


 10.0-92.0


NG/ML


 


Troponin I < 0.028  < 0.028  <0.028  NG/ML


 


Total Protein 7.4   6.4-8.2  GM/DL


 


Albumin 4.5   3.2-4.5  GM/DL








My Orders





Orders - RAMIREZ COOK MD


Ekg Tracing (12/10/22 13:08)


Cbc With Automated Diff (12/10/22 13:15)


Magnesium (12/10/22 13:15)


Chest 1 View, Ap/Pa Only (12/10/22 13:15)


Ekg Tracing (12/10/22 13:15)


Comprehensive Metabolic Panel (12/10/22 13:15)


Myoglobin Serum (12/10/22 13:15)


Protime With Inr (12/10/22 13:15)


Partial Thromboplastin Time (12/10/22 13:15)


O2 (12/10/22 13:15)


Monitor-Rhythm Ecg Trace Only (12/10/22 13:15)


Lipid Panel (22 06:00)


Ed Iv/Invasive Line Start (12/10/22 13:15)


Troponin I [place] (12/10/22 13:15)


Nitroglycerin 0.4 Mg Btl 25's (Nitrostat (12/10/22 13:15)


Troponin I [place] (12/10/22 16:15)





Vital Signs/I&O











 12/10/22





 13:08


 


Pulse 72


 


Resp 23


 


B/P (MAP) 181/89 (119)











Progress


Progress Note #1:  


   Time:  13:32


Progress Note


no pain at this time - will hold off on nitro.  Patient's  did state that

he gave her 1 SL ntg PTA.


Progress Note #2:  


   Time:  14:46


Progress Note


Patient is still completely asymptomatic. VSS. Labs reviewed and CXR. All WNL.  

Will repeat 3 hour troponin.


Initial ECG Impression Date:  Dec 10, 2022


Initial ECG Impression Time:  13:12


Initial ECG Rate:  65


Initial ECG Rhythm:  Normal Sinus


Initial ECG Intervals:  Normal


Initial ECG Intervals


MD interval 173








Comment


No ectopy is noted, normal EKG without ST segment elevation or depression





Diagnostic Imaging





   Diagonstic Imaging:  Xray


   Plain Films/CT/US/NM/MRI:  chest


Comments


                 [facility]


                                [place], [place]





NAME:   MAY SCHUMACHER


[facility] REC#:   [id number]


ACCOUNT#:   [id number]


PT STATUS:   REG ER


:   1945


PHYSICIAN:   RAMIREZ COOK MD


ADMIT DATE:   12/10/22/ER


                                   ***Draft***


Date of Exam:12/10/22





CHEST 1 VIEW, AP/PA ONLY








INDICATION: Chest pain.





COMPARISON: 2021.





FINDINGS:


Stable enlargement of the cardiac silhouette without edema or


failure. There is a small hiatal hernia. There is no evidence of


pneumonia. There is no large effusion. There is no pneumothorax.


Pulmonary vascularity is appropriate.





IMPRESSION: Stable appearance of the chest. No acute


cardiopulmonary process demonstrated.





  Dictated on workstation # [id number]








Dict:   12/10/22 1336


Trans:   12/10/22 1344


CVB [id number]





Interpreted by:     CRYSTAL BURCIAGA MD


Electronically signed by:





Departure


Impression





   Primary Impression:  


   Chest pain


   Qualified Codes:  R07.9 - Chest pain, unspecified


Disposition:   HOME, SELF-CARE


Condition:  Improved





Departure-Patient Inst.


Decision time for Depature:  17:00


Referrals:  


FRANK SHEEHAN MD (PCP/Family)


Primary Care Physician


Patient Instructions:  Chest Pain That Is Not Caused by the Heart (DC)





Add. Discharge Instructions:  


Continue your daily medications as prescribed by your primary care provider.





Follow-up with your heart doctor as scheduled/needed.





If you have any worsening chest pain especially with nausea, shortness of 

breath, sweating or any other emergent concerning symptoms please come back to 

the emergency department for reevaluation.


Scripts


Nitroglycerin (Nitroglycerin) 0.4 Mg Tab.subl


0.4 MG SL AS NEEDED PRN for chest pain, #60 TAB


   1 tablet under the tongue as needed for chest pain. May repeat every


   5 minutes to a total of 3 in 24 hours.


   Prov: RAMIREZ COOK MD         12/10/22





Copy


Copies To 1:   FRANK SHEEHAN MD, KATHRYN M MD         Dec 10, 2022 13:20

## 2022-12-10 NOTE — DIAGNOSTIC IMAGING REPORT
INDICATION: Chest pain.



COMPARISON: 01/20/2021.



FINDINGS:

Stable enlargement of the cardiac silhouette without edema or

failure. There is a small hiatal hernia. There is no evidence of

pneumonia. There is no large effusion. There is no pneumothorax.

Pulmonary vascularity is appropriate.



IMPRESSION: Stable appearance of the chest. No acute

cardiopulmonary process demonstrated.



Dictated by: 



  Dictated on workstation # [id number]

## 2022-12-28 ENCOUNTER — HOSPITAL ENCOUNTER (OUTPATIENT)
Dept: HOSPITAL 75 - CARD | Age: 77
End: 2022-12-28
Attending: INTERNAL MEDICINE
Payer: MEDICARE

## 2022-12-28 VITALS — HEIGHT: 62.99 IN | WEIGHT: 156.53 LBS | BODY MASS INDEX: 27.73 KG/M2

## 2022-12-28 VITALS — DIASTOLIC BLOOD PRESSURE: 84 MMHG | SYSTOLIC BLOOD PRESSURE: 182 MMHG

## 2022-12-28 DIAGNOSIS — I25.10: ICD-10-CM

## 2022-12-28 DIAGNOSIS — R07.2: ICD-10-CM

## 2022-12-28 DIAGNOSIS — I10: Primary | ICD-10-CM

## 2022-12-28 PROCEDURE — 93017 CV STRESS TEST TRACING ONLY: CPT

## 2022-12-28 PROCEDURE — 78452 HT MUSCLE IMAGE SPECT MULT: CPT

## 2022-12-28 RX ADMIN — Medication PRN ML: at 06:55

## 2022-12-28 RX ADMIN — Medication PRN ML: at 08:09

## 2022-12-28 NOTE — CARDIOLOGY STRESS TEST REPORT
Stress Test Report


Date of Procedure/Referring:


Date of Procedure:  Dec 28, 2022


PCP


Arlen Garcia MD


Admitting Physician


Admitting Physician:


 








Attending Physician:


Ave Muniz MD





Indications:


CP





Baseline Heart Rate:


56





Baseline Blood Pressure:


Blood Pressure Systolic:  182


Blood Pressure Diastolic:  84


Baseline Vitals





Vital Signs








  Date Time  Temp Pulse Resp B/P (MAP) Pulse Ox O2 Delivery O2 Flow Rate FiO2


 


12/28/22 07:55  63 20 182/84 (116)    











Baseline EKG:


Baseline EKG:  NSR





Summary


After explaining the procedure to the patient, she  signed a consent and then 

brought to the stress nuclear laboratory.


Patient received 0.4 mg Lexiscan for stress test, ECG, heart rate and blood 

pressure were monitored continuously.  Resting and stress dose of radio tracer 

were injected, imaging was acquired and reviewed in short axis, horizontal long 

axis and vertical long axis views.


TID:  0.97


SSS:  4


SDS:  3


EF:  72


1.  Patient tolerated Lexiscan well


2.  Breast attenuation with no ischemia or infarction noted on SPECT images


3.  Normal left ventricular size, ejection fraction 72%











AVE MUNIZ MD              Dec 28, 2022 10:22

## 2023-01-11 ENCOUNTER — HOSPITAL ENCOUNTER (OUTPATIENT)
Dept: HOSPITAL 75 - CATH | Age: 78
End: 2023-01-11
Attending: INTERNAL MEDICINE
Payer: MEDICARE

## 2023-01-11 VITALS — DIASTOLIC BLOOD PRESSURE: 64 MMHG | SYSTOLIC BLOOD PRESSURE: 116 MMHG

## 2023-01-11 VITALS — DIASTOLIC BLOOD PRESSURE: 61 MMHG | SYSTOLIC BLOOD PRESSURE: 109 MMHG

## 2023-01-11 VITALS — BODY MASS INDEX: 27.55 KG/M2 | HEIGHT: 63.78 IN | WEIGHT: 159.39 LBS

## 2023-01-11 VITALS — SYSTOLIC BLOOD PRESSURE: 144 MMHG | DIASTOLIC BLOOD PRESSURE: 75 MMHG

## 2023-01-11 VITALS — SYSTOLIC BLOOD PRESSURE: 105 MMHG | DIASTOLIC BLOOD PRESSURE: 60 MMHG

## 2023-01-11 VITALS — SYSTOLIC BLOOD PRESSURE: 108 MMHG | DIASTOLIC BLOOD PRESSURE: 54 MMHG

## 2023-01-11 VITALS — SYSTOLIC BLOOD PRESSURE: 104 MMHG | DIASTOLIC BLOOD PRESSURE: 68 MMHG

## 2023-01-11 VITALS — DIASTOLIC BLOOD PRESSURE: 66 MMHG | SYSTOLIC BLOOD PRESSURE: 104 MMHG

## 2023-01-11 VITALS — DIASTOLIC BLOOD PRESSURE: 52 MMHG | SYSTOLIC BLOOD PRESSURE: 103 MMHG

## 2023-01-11 VITALS — SYSTOLIC BLOOD PRESSURE: 104 MMHG | DIASTOLIC BLOOD PRESSURE: 54 MMHG

## 2023-01-11 VITALS — SYSTOLIC BLOOD PRESSURE: 107 MMHG | DIASTOLIC BLOOD PRESSURE: 59 MMHG

## 2023-01-11 DIAGNOSIS — E11.9: ICD-10-CM

## 2023-01-11 DIAGNOSIS — Z79.84: ICD-10-CM

## 2023-01-11 DIAGNOSIS — E66.9: ICD-10-CM

## 2023-01-11 DIAGNOSIS — I65.23: ICD-10-CM

## 2023-01-11 DIAGNOSIS — I10: ICD-10-CM

## 2023-01-11 DIAGNOSIS — I48.0: ICD-10-CM

## 2023-01-11 DIAGNOSIS — Z79.899: ICD-10-CM

## 2023-01-11 DIAGNOSIS — E78.2: ICD-10-CM

## 2023-01-11 DIAGNOSIS — I25.10: Primary | ICD-10-CM

## 2023-01-11 LAB
ALBUMIN SERPL-MCNC: 4.6 GM/DL (ref 3.2–4.5)
ALP SERPL-CCNC: 32 U/L (ref 40–136)
ALT SERPL-CCNC: 16 U/L (ref 0–55)
APTT BLD: 36 SEC (ref 24–35)
BILIRUB SERPL-MCNC: 0.5 MG/DL (ref 0.1–1)
BUN/CREAT SERPL: 20
CALCIUM SERPL-MCNC: 9.6 MG/DL (ref 8.5–10.1)
CHLORIDE SERPL-SCNC: 107 MMOL/L (ref 98–107)
CHOLEST SERPL-MCNC: 120 MG/DL (ref ?–200)
CO2 SERPL-SCNC: 28 MMOL/L (ref 21–32)
CREAT SERPL-MCNC: 0.76 MG/DL (ref 0.6–1.3)
GFR SERPLBLD BASED ON 1.73 SQ M-ARVRAT: 80 ML/MIN
GLUCOSE SERPL-MCNC: 110 MG/DL (ref 70–105)
HCT VFR BLD CALC: 42 % (ref 35–52)
HDLC SERPL-MCNC: 66 MG/DL (ref 40–60)
HGB BLD-MCNC: 13.8 G/DL (ref 11.5–16)
INR PPP: 1 (ref 0.8–1.4)
MCH RBC QN AUTO: 29 PG (ref 25–34)
MCHC RBC AUTO-ENTMCNC: 33 G/DL (ref 32–36)
MCV RBC AUTO: 87 FL (ref 80–99)
PLATELET # BLD: 206 10^3/UL (ref 130–400)
PMV BLD AUTO: 11.1 FL (ref 9–12.2)
POTASSIUM SERPL-SCNC: 3.7 MMOL/L (ref 3.6–5)
PROT SERPL-MCNC: 7.5 GM/DL (ref 6.4–8.2)
PROTHROMBIN TIME: 13.7 SEC (ref 12.2–14.7)
SODIUM SERPL-SCNC: 142 MMOL/L (ref 135–145)
TRIGL SERPL-MCNC: 31 MG/DL (ref ?–150)
VLDLC SERPL CALC-MCNC: 6 MG/DL (ref 5–40)
WBC # BLD AUTO: 7.2 10^3/UL (ref 4.3–11)

## 2023-01-11 PROCEDURE — 85027 COMPLETE CBC AUTOMATED: CPT

## 2023-01-11 PROCEDURE — 93005 ELECTROCARDIOGRAM TRACING: CPT

## 2023-01-11 PROCEDURE — 36415 COLL VENOUS BLD VENIPUNCTURE: CPT

## 2023-01-11 PROCEDURE — 80061 LIPID PANEL: CPT

## 2023-01-11 PROCEDURE — 85610 PROTHROMBIN TIME: CPT

## 2023-01-11 PROCEDURE — 36221 PLACE CATH THORACIC AORTA: CPT

## 2023-01-11 PROCEDURE — 93458 L HRT ARTERY/VENTRICLE ANGIO: CPT

## 2023-01-11 PROCEDURE — 80053 COMPREHEN METABOLIC PANEL: CPT

## 2023-01-11 PROCEDURE — 85730 THROMBOPLASTIN TIME PARTIAL: CPT

## 2023-01-11 PROCEDURE — 87081 CULTURE SCREEN ONLY: CPT

## 2023-01-11 PROCEDURE — 71045 X-RAY EXAM CHEST 1 VIEW: CPT

## 2023-01-11 NOTE — CARDIAC PROCEDURE NOTE-CS/ASA
Pre-Procedure Note


Pre-Op Procedure Note


Date of Available H&P:  Yordy 3, 2023


Date H&P Reviewed:  Jan 11, 2023


Time H&P Reviewed:  09:54


History & Physical:  H&P Reviewed, Patient Examed, No changes noted


Pre-Operative Diagnosis:  CAD





Conscious Sedation Pre-Proced


Time


09:54





ASA Score


3


For ASA 3 and 4: Consider anesthesia and medical clearance. Also, for patients

with a history of failed moderate sedation consider anesthesia.

















Airway 


 


Lungs 


 


Heart 


 


 ASA score


 


 ASA 1: a normal healthy patient


 


 ASA 2:  a patient with a mild systemic disease (mid diabetes, controlled 

hypertension, obesity 


 


 ASA 3:  a patient with a severe systemic disease that limits activity  (angina,

COPD, prior Myocardial infarction)


 


 ASA 4:  a patient with an incapacitating disease that is a constant threat to 

life (CHF, renal failure)


 


 ASA 5:  a moribund patient not expected to survive 24 hrs.  (ruptured aneurysm)


 


 ASA 6:  a declared brain-dead patient whose organs are being harvested.


 


 For emergent operations, add the letter E after the classification











Mallampati Classification


Grade 3





Sedation Plan


Analgesia, Amnesia, Plan communicated to team members, Discussed options with 

patient/fam, Discussed risks with patient/fam


The patient is an appropriate candidate to undergo the planned procedure, 

sedation, and anesthesia.





The patient immediately re-assessed prior to indication.











AVE MARROQUIN MD              Jan 11, 2023 09:54

## 2023-01-11 NOTE — DISCHARGE INST-POST CATH
Discharge Inst-CATH/EP


Problems Reviewed?:  Yes


Post Cardiac Cath/EP D/C Inst


Follow Up/Plan


Appointment with Dr. Muniz's office in 2 to 4 weeks


<b>CARDIAC CATH/EP PROCEDURE DISCHARGE INSTRUCTIONS</b>





ACTIVITY





* Go Home directly and rest.


* Limit activity of the leg (or wrist if it was used) for 7 days including aer

obics, swimming,


   jogging, bicycling, etc.


* Restrict stair-climbing for 7 days if possible, if not, climb up with your 

non-cath leg, then


   bring together on the same step.


* Avoid lifting, pushing, pulling or excessive movement of the affected extremi

ty for 7 days.


* Customary sexual activity may be resumed after 2 days-use caution not to use a

position  


   that strains or causes pain to the affected extremity.


* No driving for 24 hours.


* NO SMOKING. 


* Avoid straining for bowel movements for 7 days.


* Gentle walking on level ground is allowed.


* Returning to work will depend on the type of procedure and the results. Your 

doctor will discuss


   this with you.





CALL YOUR DOCTOR FOR ANY OF THE FOLLOWING:





*If bleeding from the puncture site occurs- Apply gentle pressure to site with 

clean cloth and call


   your doctor or EMS.


* If a knot or lump forms under the skin, increases in size, or causes pain.


* If bruising appears to be worsening or moving further down your leg instead of

disappearing.


* Temperature above 101 F.





CARE OF YOUR GROIN INCISION;





* Bruising or purple discoloration of the skin near the puncture site is common.


* You may shower only, no bathtub bathing for 5 days.  Be careful to avoid 

slipping as your


   leg may feel stiff.


* If a closure device was used on your femoral artery, please see the attached 

guide regarding


   care of the device and your leg.


* Leave dressing on FOR 24 hours.





CARE OF YOUR WRIST INCISION;





* Bruising or purple discoloration of the skin near the puncture site is common.


* You may shower.


* DO NOT submerge wrist.


* Leave dressing on FOR 24 hours.











AVE MUNIZ MD              Jan 11, 2023 10:25

## 2023-01-11 NOTE — DIAGNOSTIC IMAGING REPORT
EXAM: CHEST 1 VIEW, AP/PA ONLY



INDICATION: Chest pain.



COMPARISON: 12/10/2022.



FINDINGS: Normal heart size and central pulmonary vascularity.

Lungs are clear. No pleural effusion or pneumothorax. No acute

osseous findings. No significant change.



IMPRESSION: No acute cardiopulmonary findings.



Dictated by: 



  Dictated on workstation # [id number]

## 2023-01-11 NOTE — CARDIAC CATH REPORT
Cardiac Cath Report


Physician (s)/Assistant (s)


Physician


AVE MARROQUIN MD





Pre-Procedure Diagnosis


Pre-Procedure Diagnosis:  CAD





Post-Procedure Note


Procedure Start Date:  Jan 11, 2023


Name of Procedure:  


Left heart catheterization


Left ventriculogram


Aortic arch angiogram


Findings/Procedure Note


PROCEDURE NOTE:


77-year-old lady with paroxysmal atrial fibrillation, recurrent chest pain, had 

multiple ER visits, still having recurrent chest pain.  Due to the fact that she

is receiving flecainide treatment I decided to proceed with cardiac 

catheterization possible PTCA.


After explaining the procedure to the patient, all pros and cons were explained,

all questions were answered.  The patient signed the consent and then she  was 

placed in the cardiac catheterization laboratory. Groin was prepped in SL 

fashion local anesthesia was used. Sheath placed in the right radial artery, 

there is significant tortuosity in the radial artery I was unable to advance the

wire, tried with baby J-wire without success.  Sheath was left in the radial 

artery then I placed a sheath in the right femoral artery, combination of right 

and left Anam catheter were used to access the right and left coronary 

system, angiogram was done, pigtail catheter advanced to the left ventricular 

cavity, left ventriculogram was done.  Pullback LV to aorta was done, aortic 

arch angiogram was done to evaluate the aorta due to the recurrent chest pain.


At the end of the procedure the sheath was removed. Closure device was deployed 

in the right femoral artery, vascular band was used in the right radial artery





FINDINGS:





Hemodynamics 


/16, end-diastolic pressure of 16


Aorta 100/48 mean of 66





ANATOMY:


Left Main is free of obstructive disease


Left Anterior Descending is smaller artery with no significant obstructive 

disease


Left Circumflex is moderate in size with no obstructive disease


Right Coronary Artery is dominant artery with slow flow due to small vessel 

disease nonobstructive disease


LV Gram was done showing normal left ventricular size and contractility, normal 

ejection fraction.  60%


Aorta evaluation done with aortic arch angiogram showing normal aortic arch, no 

dissection or aneurysm, normal origin of the brachiocephalic artery, left 

carotid and left subclavian arteries.





CONCLUSION:


1.  Dominant right coronary system with slow flow in the right coronary artery, 

no significant obstructive disease was noted in the coronary system


2.  Normal left ventricular systolic function, ejection fraction 60%, normal 

left ventricular end-diastolic pressure


3.  Normal aortic arch and great vessels of the neck





DISCUSSION AND RECOMMENDATION:


Medical therapy is recommended no intervention is warranted


Anesthesia Type:  Conscious Sedation


Estimated blood loss (mL):  20 ml


Contrast Amount:  50 ml


Total Radiation Dose:  390 mGy





Post-Procedure Diagnosis


Post-operative diagnosis:  


Chest pain


Coronary artery disease


Paroxysmal atrial fibrillation


Hypertension


Hyperlipidemia











AVE MARROQUIN MD              Jan 11, 2023 10:29

## 2023-11-07 ENCOUNTER — HOSPITAL ENCOUNTER (OUTPATIENT)
Dept: HOSPITAL 75 - RAD | Age: 78
End: 2023-11-07
Attending: FAMILY MEDICINE
Payer: MEDICARE

## 2023-11-07 DIAGNOSIS — M25.572: ICD-10-CM

## 2023-11-07 DIAGNOSIS — E55.9: ICD-10-CM

## 2023-11-07 DIAGNOSIS — Z78.0: ICD-10-CM

## 2023-11-07 DIAGNOSIS — M16.12: Primary | ICD-10-CM

## 2023-11-07 DIAGNOSIS — G89.29: ICD-10-CM

## 2023-11-07 DIAGNOSIS — M81.0: ICD-10-CM

## 2023-11-07 PROCEDURE — 77080 DXA BONE DENSITY AXIAL: CPT

## 2023-11-07 PROCEDURE — 73610 X-RAY EXAM OF ANKLE: CPT

## 2023-11-07 PROCEDURE — 73502 X-RAY EXAM HIP UNI 2-3 VIEWS: CPT

## 2023-11-07 NOTE — DIAGNOSTIC IMAGING REPORT
HIP, LEFT, 2 VIEWS



INDICATION: Left hip pain



COMPARISON: None available.



TECHNIQUE: 2 views left hip



FINDINGS: Severe degenerative arthritis of left hip is present.

There is a ring of osteophytes around the femoral head-neck

junction. No features of avascular necrosis of the femoral head.

Degenerative changes are present in both SI joints and at the

symphysis pubis. No worrisome focal osseous lesions.



IMPRESSION: Severe osteoarthritis of the left hip.



Dictated by: 



  Dictated on workstation # [id number]

## 2023-11-07 NOTE — DIAGNOSTIC IMAGING REPORT
INDICATION: Postmenopausal state



COMPARISON: 10/19/2021



FINDINGS:



AP Spine L1-L4:  

[BMD (g/cm2): 1.453] [T-Score: 2.1] [Z-Score: NA]

[BMD Previous: 1.33] [BMD % Change: 9.0*]



LT Hip Neck:       

[BMD (g/cm2): 0.834] [T-Score: -1.5] [Z-Score: NA]



LT Hip Total:       

[BMD (g/cm2):0.830] [T-Score:-1.4] [Z-Score: NA]

[BMD Previous: 0.856] [BMD % Change: -3.0]



RT Hip Neck:      

[BMD (g/cm2):0.793] [T-Score:-1.8] [Z-Score:NA]



RT Hip Total:      

[BMD (g/cm2):0.829] [T-score:-1.4] [Z-Score:NA]

[BMD Previous:0.826] [BMD % Change:0.4]



*Indicates significant change from prior examination based on 95%

confidence level.



World Health Organization criteria for BMD interpretation

classify patients as Normal (T-score at or above -1.0),

Osteopenic (T-score between -1.0 and -2.5) or Osteoporotic

(T-score at or below -2.5).



LIMITATIONS AND MODIFICATION:  None.



FRACTURE RISK (FRAX SCORE):

The ten year probability of (%): 

Major Osteoporotic Fracture: [14.8]

Hip Fracture: [3.9]



IMPRESSION:

1. Osteopenia (Low bone mass).

2. Bone mineral density within the lumbar spine has significantly

increased from the prior examination. Bone mineral density within

the bilateral hips has not significantly changed.

3. See below National Osteoporosis Foundation guidelines on when

to potentially initiate pharmacologic therapy. 



Based on the National Osteoporosis Foundation Guidelines,

pharmacologic treatment should be initiated in any of the

following, unless clinical conditions suggest otherwise:



*  Any patient with prior fragility fracture of the hip or

vertebrae. A spine fracture indicates 5X risk for subsequent

spine fracture and 2X risk for subsequent hip fracture.



*  Osteoporosis (T-score <-2.5).



*  Postmenopausal women and men age 50 and older with low bone

mass/osteopenia (T-score between -1.0 and -2.5) by DXA and

10-year major osteoporotic fracture greater than 20% or a 10-year

probability of hip fracture greater than 3%. These fracture risks

are supplied above in the FRAX score, if applicable.



*  Clinician judgement and/or patient preferences may indicate

treatment for people with 10-year fracture probabilities above or

below these levels.



Dictated by: 



  Dictated on workstation # [id number]

## 2023-11-07 NOTE — DIAGNOSTIC IMAGING REPORT
ANKLE, LEFT, 3 VIEWS



COMPARISON: None available. 



INDICATION: Left ankle pain



TECHNIQUE: Non-weight bearing AP, oblique, and lateral views. 



FINDINGS:

No fracture or traumatic malalignment. No osteochondral lesion of

the talar dome.  Normal variant os peroneum. Tiny plantar

calcaneal spur. Achilles shadow is normal.



IMPRESSION:

1. No osseous abnormality about the ankle.



Dictated by: 



  Dictated on workstation # [id number]

## 2023-11-22 ENCOUNTER — HOSPITAL ENCOUNTER (EMERGENCY)
Dept: HOSPITAL 75 - ER | Age: 78
Discharge: HOME | End: 2023-11-22
Payer: MEDICARE

## 2023-11-22 VITALS — DIASTOLIC BLOOD PRESSURE: 85 MMHG | SYSTOLIC BLOOD PRESSURE: 180 MMHG

## 2023-11-22 VITALS — HEIGHT: 66.02 IN | WEIGHT: 149.91 LBS | BODY MASS INDEX: 24.09 KG/M2

## 2023-11-22 DIAGNOSIS — N39.0: Primary | ICD-10-CM

## 2023-11-22 LAB
AMORPH SED URNS QL MICRO: (no result) /LPF
APTT PPP: YELLOW S
BACTERIA #/AREA URNS HPF: (no result) /HPF
BILIRUB UR QL STRIP: NEGATIVE
FIBRINOGEN PPP-MCNC: CLEAR MG/DL
GLUCOSE UR STRIP-MCNC: NEGATIVE MG/DL
KETONES UR QL STRIP: NEGATIVE
LEUKOCYTE ESTERASE UR QL STRIP: (no result)
NITRITE UR QL STRIP: NEGATIVE
PH UR STRIP: 7.5 [PH] (ref 5–9)
PROT UR QL STRIP: NEGATIVE
RBC #/AREA URNS HPF: (no result) /HPF
SP GR UR STRIP: 1.01 (ref 1.02–1.02)

## 2023-11-22 PROCEDURE — 81000 URINALYSIS NONAUTO W/SCOPE: CPT

## 2023-11-22 PROCEDURE — 87186 SC STD MICRODIL/AGAR DIL: CPT

## 2023-11-22 PROCEDURE — 87077 CULTURE AEROBIC IDENTIFY: CPT

## 2023-11-22 PROCEDURE — 87088 URINE BACTERIA CULTURE: CPT

## 2023-11-22 PROCEDURE — 99283 EMERGENCY DEPT VISIT LOW MDM: CPT

## 2023-11-22 NOTE — ED GENERAL
General


Stated Complaint:  BURNING WITH URINATION


Source of Information:  Patient





History of Present Illness


Date Seen by Provider:  Nov 22, 2023


Time Seen by Provider:  02:00


Initial Comments


Patient is an 78-year-old female who presents to the emergency department with a

chief complaint of burning with urination and urinary frequency, onset last 

night.  She states that she worked a lot yesterday and probably did not drink as

much water as she should.  She does not have history of frequent urinary tract 

infections, she states she thinks the last 1 may have been about 10 years ago.  

She denies fevers, chills.  She denies back pain.  She denies nausea, vomiting. 

No diarrhea.  She has a history of hypertension.  No allergies to medications.  

Has not taken anything for the discomfort.


Timing/Duration:  4-6 Hours


Severity:  Moderate


Associated Systoms:  Denies Symptoms





Allergies and Home Medications


Allergies


Coded Allergies:  


     No Known Drug Allergies (Unverified , 11/10/17)





Patient Home Medication List


Home Medication List Reviewed:  Yes


Apixaban (Eliquis) 5 Mg Tablet, 5 MG PO BID, (Reported)


   Entered as Reported by: NOE SLOAN on 1/20/21 1111


Ascorbic Acid (Vitamin C) 1,000 Mg Tablet, 1,000 MG PO DAILY, (Reported)


   Entered as Reported by: LIUDMILA MATHEWS on 1/11/23 0934


Carboxymethylcellulose Sodium (Refresh Tears) 0.5 % Drops, 1 DROP OU DAILY PRN 

for DRY EYES, (Reported)


   Entered as Reported by: LIUDMILA MATHEWS on 1/11/23 0934


Cephalexin (Cephalexin) 500 Mg Tablet, 500 MG PO TID


   Prescribed by: RAMIREZ COOK on 11/22/23 0243


Cholecalciferol (Vitamin D3) (Vitamin D3) 50 Mcg (2000 Unit) Tablet, 50 MCG PO 

DAILY, (Reported)


   Entered as Reported by: LIUDMILA MATHEWS on 1/11/23 0934


Diltiazem HCl (Cardizem Cd) 180 Mg Cap.er.24h, 180 MG PO DAILY, (Reported)


   Entered as Reported by: MARSHALL STONE on 8/29/22 0940


Flecainide Acetate (Flecainide Acetate) 50 Mg Tablet, 50 MG PO BID, (Reported)


   Entered as Reported by: NEO SLOAN on 1/20/21 1111


Losartan Potassium (Losartan Potassium) 50 Mg Tablet, 50 MG PO 1700, (Reported)


   Entered as Reported by: MARSHALL STONE on 8/29/22 0940


Meloxicam (Meloxicam) 7.5 Mg Tablet, 7.5 MG PO DAILY PRN for ARTHRITIS PAIN, 

(Reported)


   Entered as Reported by: MARSHALL STONE on 8/29/22 0940


Nitroglycerin (Nitroglycerin) 0.4 Mg Tab.subl, 0.4 MG SL UD PRN for CHEST PAIN, 

(Reported)


   Entered as Reported by: LIUDMILA MATHEWS on 1/11/23 0931


Prednisolone Acetate (Prednisolone Acetate) 1 % Drops.susp, 1 DROP OS DAILY, 

(Reported)


   Entered as Reported by: LIUDMILA MATHEWS on 1/11/23 0934


Zinc Gluconate (Zinc) 50 Mg Tablet, 50 MG PO DAILY, (Reported)


   Entered as Reported by: LIUDIMLA MATHEWS on 1/11/23 0934





Review of Systems


Review of Systems


Constitutional:  see HPI


EENTM:  no symptoms reported


Respiratory:  no symptoms reported


Cardiovascular:  no symptoms reported


Gastrointestinal:  abdominal pain


Genitourinary:  dysuria, frequency


Pregnant:  No


Musculoskeletal:  no symptoms reported


Skin:  no symptoms reported





Past Medical-Social-Family Hx


Immunizations Up To Date


Tetanus Booster (TDap):  Unknown


First/Initial COVID19 Vaccinat:  YES


Second COVID19 Vaccination Edgar:  YES





Seasonal Allergies


Seasonal Allergies:  No





Past Medical History


Surgery/Hospitalization HX:  


EYE SURGERY


Surgeries:  Yes (LYPOMA EXCISED)


Lumpectomy


Respiratory:  No


Cardiac:  Yes


Atrial Fibrillation, Hypertension, Irregular Heartbeat


Neurological:  No


Reproductive Disorders:  No


Sexually Transmitted Disease:  No


HIV/AIDS:  No


Genitourinary:  No


Gastrointestinal:  Yes (EPIGASTRIC PAIN, HX ULCER)


Gastroesophageal Reflux, Hiatal Hernia, Ulcer


Musculoskeletal:  No


Endocrine:  Yes


Diabetes, Non-Insulin dep


HEENT:  No


Loss of Vision:  Bilateral


Hearing Impairment:  Denies


Cancer:  No


Did You Recieve Any Treatments:  No


Psychosocial:  No


Integumentary:  No


Blood Disorders:  No


Adverse Reaction/Blood Tranf:  No





Family Medical History





Hypertension


  19 FATHER


No Pertinent Family Hx





Physical Exam


Vital Signs





Vital Signs - First Documented








 11/22/23





 01:51


 


Temp 36.5


 


Pulse 63


 


Resp 18


 


B/P (MAP) 180/85 (116)


 


Pulse Ox 96


 


O2 Delivery Room Air





Capillary Refill :


Height, Weight, BMI


Height: 5'5.00"


Weight: 162lbs. 0.0oz. [id number]; 27.54 BMI


Method:Stated


General Appearance:  No Apparent Distress, WD/WN


Eyes:  Bilateral Eye Normal Inspection, Bilateral Eye PERRL, Bilateral Eye EOMI


HEENT:  PERRL/EOMI


Respiratory:  Lungs Clear, Normal Breath Sounds, No Accessory Muscle Use, No 

Respiratory Distress


Cardiovascular:  Regular Rate, Rhythm, Normal Peripheral Pulses (2+ radial 

pulses)


Gastrointestinal:  Soft, Tenderness (suprapubic tenderness)


Back:  No CVA Tenderness


Extremity:  Normal Inspection, Normal Range of Motion, No Pedal Edema


Neurologic/Psychiatric:  Alert, Oriented x3, No Motor/Sensory Deficits, Normal 

Mood/Affect


Skin:  Normal Color, Warm/Dry





Progress/Results/Core Measures


Suspected Sepsis


SIRS


Temperature: 


Pulse:  


Respiratory Rate: 


 


Blood Pressure  / 


Mean:





Results/Orders


Lab Results





Laboratory Tests








Test


 11/22/23


01:58 Range/Units


 


 


Urine Color YELLOW   


 


Urine Clarity CLEAR   


 


Urine pH 7.5  5-9  


 


Urine Specific Gravity 1.015 L 1.016-1.022  


 


Urine Protein NEGATIVE  NEGATIVE  


 


Urine Glucose (UA) NEGATIVE  NEGATIVE  


 


Urine Ketones NEGATIVE  NEGATIVE  


 


Urine Nitrite NEGATIVE  NEGATIVE  


 


Urine Bilirubin NEGATIVE  NEGATIVE  


 


Urine Urobilinogen 0.2  < = 1.0  MG/DL


 


Urine Leukocyte Esterase 3+ H NEGATIVE  


 


Urine RBC (Auto) 2+ H NEGATIVE  


 


Urine RBC 5-10 H  /HPF


 


Urine WBC 10-25 H  /HPF


 


Urine Crystals PRESENT H  /LPF


 


Urine Amorphous Sediment


 FEW JUNE


PHOSPHATE H  /LPF





 


Urine Bacteria MODERATE H  /HPF


 


Urine Casts NONE   /LPF


 


Urine Mucus SMALL H  /LPF


 


Urine Culture Indicated YES   








My Orders





Orders - RAMIREZ COOK MD


Ua Culture If Indicated (11/22/23 01:58)


Phenazopyridine Tablet (Phenazopyridine (11/22/23 02:15)


Urine Culture (11/22/23 01:58)


Cephalexin Capsule (Cephalexin Capsule) (11/22/23 02:38)





Medications Given in ED





Current Medications








 Medications  Dose


 Ordered  Sig/Fina


 Route  Start Time


 Stop Time Status Last Admin


Dose Admin


 


 Phenazopyridine


 HCl  100 mg  ONCE  ONCE


 PO  11/22/23 02:15


 11/22/23 02:16 DC 11/22/23 02:28


100 MG








Vital Signs/I&O











 11/22/23





 01:51


 


Temp 36.5


 


Pulse 63


 


Resp 18


 


B/P (MAP) 180/85 (116)


 


Pulse Ox 96


 


O2 Delivery Room Air





Capillary Refill :


Progress Note :  


   Time:  02:46


Progress Note


Patient seen and evaluated by me.  Evaluation today includes physical exam, urin

alysis.  Pertinent physical exam findings well-developed well-nourished 

78-year-old female in mild distress due to lower abdominal discomfort and 

dysuria.  Vital signs are stable, she is afebrile.  Heart is regular, lungs are 

clear.  Abdomen is mildly tender in the suprapubic region. 





 Differential diagnosis includes urinary tract infection





Patient's labs independently reviewed and interpreted by me.  Her urinalysis 

shows 3+ leukocyte Estrace with 5-10 red blood cells and 10-25 white blood cells

per high-powered field as well as moderate bacteria.  Patient is treated in the 

emergency department with 100 mg of Pyridium as well as a 500 mg tablet of 

cephalexin.  She is afebrile and nontoxic in appearance.  No concerning findings

for pyelonephritis.  She will be discharged with cephalexin 500 mg 3 times daily

for 5 days.  She is advised to use Pyridium for the bladder spasm/pain.  Return 

precautions provided in both verbal and written format.  All questions are 

sought and answered.  Patient is stable for discharge.





Departure


Impression





   Primary Impression:  


   UTI (urinary tract infection)


   Qualified Codes:  N30.01 - Acute cystitis with hematuria


Disposition:  01 HOME, SELF-CARE


Condition:  Stable





Departure-Patient Inst.


Decision time for Depature:  02:43


Referrals:  


FRANK DIAZ MD (PCP/Family)


Primary Care Physician


Patient Instructions:  Urinary Tract Infection, Adult ED





Add. Discharge Instructions:  


Take the antibiotic, cephalexin 500 mg tablets 3 times daily for 5 days starting

tomorrow.





Ask your pharmacist to show you where to find the Pyridium (medication for 

bladder spasms). Take 1 tablet 3 times a day for the next 2 days for the pain 

with urination.





If you develop increased pain, especially with fever or vomiting, please return 

to the Emergency Department for re-evaluation.





Follow up with your primary care doctor as needed/scheduled.


Scripts


Cephalexin (Cephalexin) 500 Mg Tablet


500 MG PO TID, #15 TAB 0 Refills


   Prov: RAMIREZ COOK MD         11/22/23





Copy


Copies To 1:   FRANK DIAZ MD, KATHRYN M MD         Nov 22, 2023 01:59